# Patient Record
(demographics unavailable — no encounter records)

---

## 2024-11-04 NOTE — PHYSICAL EXAM
[Normal] : no peripheral adenopathy appreciated [de-identified] : No cervical, axillary or inguinal LAD noted

## 2024-11-04 NOTE — PHYSICAL EXAM
[Normal] : no peripheral adenopathy appreciated [de-identified] : No cervical, axillary or inguinal LAD noted
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
## 2024-11-04 NOTE — ASSESSMENT
[FreeTextEntry1] : 51-year-old woman with no significant past medical history here for evaluation of leukopenia.   Patient recently noted to have a decreased WBC.  Recent CBC on 9/7/2024 showed WBC 3.28, HGB 13.9, HCT 42.5, MCV 97.7 and ,000.  In EMR, mindy has had intermittent leukopenia since 11/2015.  Patient diagnosed with sinus infection three weeks ago and received antibiotics with resolution in symptoms.  She denies fever/chills or other recent infections.   Plan: Will check CBC, CMP, B12, Folate, MMA, JULIA, Rheumatoid Factor, Hepatitis B/C, HIV, TSH, [place], flow cytometry, Zinc and Copper levels.  Recommend routine colonoscopy.  Patient advised to call in 10 days to discuss all laboratory results.
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
## 2025-05-02 NOTE — HEALTH RISK ASSESSMENT
[Good] : ~his/her~  mood as  good [Yes] : Yes [Little interest or pleasure doing things] : 1) Little interest or pleasure doing things [Feeling down, depressed, or hopeless] : 2) Feeling down, depressed, or hopeless [0] : 2) Feeling down, depressed, or hopeless: Not at all (0) [PHQ-2 Negative - No further assessment needed] : PHQ-2 Negative - No further assessment needed [Never] : Never [Patient reported mammogram was normal] : Patient reported mammogram was normal [With Family] : lives with family [# of Members in Household ___] :  household currently consist of [unfilled] member(s) [Employed] : employed [College] : College [] :  [# Of Children ___] : has [unfilled] children [Feels Safe at Home] : Feels safe at home [de-identified] : Socially, maybe once a week. [de-identified] : Maintains active by working out five to seven days a week for thirty to fourty five minutes.  [de-identified] : Maintains a healhty diet.  [QSC4Hcdea] : 0 [MammogramDate] : 2024 [PapSmearDate] : 2024 [ColonoscopyDate] : Never [ColonoscopyComments] : Pt. requests a referral.

## 2025-05-02 NOTE — HISTORY OF PRESENT ILLNESS
[de-identified] : Ms. BEST JUDD is a 52 year old female with no significant PMH, presenting for an annual physical.   Pt states she is feeling well. She reports she is UTD with all screenings and wears reading glasses/contacts. Pt has never had a colonoscopy.  Pt reports FHx DM (mother) and HTN/Lung cancer (father).  Denies any SOB, CP, abdominal pain, N/V/D, headache, dizziness, or leg swelling.

## 2025-05-02 NOTE — ADDENDUM
[FreeTextEntry1] : Documented by Gracie Tyson acting as a scribe for Dr. Loza. 05/02/2025   All medical record entries made by the scribe were at my, Dr. Loza, direction and personally dictated by me on 05/02/2025. I have reviewed the chart an agree that the record accurately reflects my personal performance of the history, physical exam, assessment and plan. I have also personally directed, reviewed, and agreed with the chart.

## 2025-05-02 NOTE — PLAN
[FreeTextEntry1] : Annual Physical   Health Maintenance  Referred for mammogram  Referred to GI for colonoscopy   Bloodwork ordered.  Follow up in one week via Telemedicine for lab results.